# Patient Record
Sex: FEMALE | Race: BLACK OR AFRICAN AMERICAN | URBAN - METROPOLITAN AREA
[De-identification: names, ages, dates, MRNs, and addresses within clinical notes are randomized per-mention and may not be internally consistent; named-entity substitution may affect disease eponyms.]

---

## 2018-01-12 ENCOUNTER — HOSPITAL ENCOUNTER (OUTPATIENT)
Dept: MAMMOGRAPHY | Age: 58
Discharge: HOME | End: 2018-01-12
Attending: NURSE PRACTITIONER | Admitting: NURSE PRACTITIONER

## 2018-01-12 DIAGNOSIS — Z12.31 VISIT FOR SCREENING MAMMOGRAM: ICD-10-CM

## 2024-05-20 ENCOUNTER — HOSPITAL ENCOUNTER (OUTPATIENT)
Dept: WOMENS IMAGING | Age: 64
Discharge: HOME | End: 2024-05-20

## 2024-05-20 DIAGNOSIS — Z12.31 SCREENING MAMMOGRAM FOR BREAST CANCER: ICD-10-CM

## 2024-05-20 PROCEDURE — 77067 SCR MAMMO BI INCL CAD: CPT

## 2024-08-08 ENCOUNTER — HOSPITAL ENCOUNTER (OUTPATIENT)
Dept: WOMENS IMAGING | Age: 64
Discharge: HOME | End: 2024-08-08
Attending: RADIOLOGY

## 2024-08-08 DIAGNOSIS — R92.8 ABNORMAL MAMMOGRAM: ICD-10-CM

## 2024-08-08 PROCEDURE — G0279 TOMOSYNTHESIS, MAMMO: HCPCS

## 2025-01-25 ENCOUNTER — APPOINTMENT (OUTPATIENT)
Dept: GENERAL RADIOLOGY | Age: 65
End: 2025-01-25
Payer: MEDICARE

## 2025-01-25 ENCOUNTER — HOSPITAL ENCOUNTER (EMERGENCY)
Age: 65
Discharge: LEFT BEFORE DISCHARGE | End: 2025-01-26
Payer: MEDICARE

## 2025-01-25 VITALS
WEIGHT: 201.28 LBS | OXYGEN SATURATION: 100 % | HEIGHT: 65 IN | TEMPERATURE: 98.8 F | BODY MASS INDEX: 33.54 KG/M2 | SYSTOLIC BLOOD PRESSURE: 154 MMHG | RESPIRATION RATE: 18 BRPM | HEART RATE: 89 BPM | DIASTOLIC BLOOD PRESSURE: 75 MMHG

## 2025-01-25 DIAGNOSIS — Z53.21 ELOPED FROM EMERGENCY DEPARTMENT: Primary | ICD-10-CM

## 2025-01-25 DIAGNOSIS — I10 ELEVATED BLOOD PRESSURE READING IN OFFICE WITH DIAGNOSIS OF HYPERTENSION: ICD-10-CM

## 2025-01-25 DIAGNOSIS — Z20.822 LAB TEST NEGATIVE FOR COVID-19 VIRUS: ICD-10-CM

## 2025-01-25 DIAGNOSIS — R05.1 ACUTE COUGH: ICD-10-CM

## 2025-01-25 LAB — SARS-COV-2 RDRP RESP QL NAA+PROBE: NOT DETECTED

## 2025-01-25 PROCEDURE — 71046 X-RAY EXAM CHEST 2 VIEWS: CPT

## 2025-01-25 PROCEDURE — 6370000000 HC RX 637 (ALT 250 FOR IP): Performed by: NURSE PRACTITIONER

## 2025-01-25 PROCEDURE — 99284 EMERGENCY DEPT VISIT MOD MDM: CPT

## 2025-01-25 PROCEDURE — 87635 SARS-COV-2 COVID-19 AMP PRB: CPT

## 2025-01-25 RX ORDER — BENZONATATE 100 MG/1
100 CAPSULE ORAL ONCE
Status: COMPLETED | OUTPATIENT
Start: 2025-01-25 | End: 2025-01-25

## 2025-01-25 RX ORDER — BENZONATATE 100 MG/1
100 CAPSULE ORAL 3 TIMES DAILY PRN
Qty: 21 CAPSULE | Refills: 0 | Status: SHIPPED | OUTPATIENT
Start: 2025-01-25 | End: 2025-02-01

## 2025-01-25 RX ADMIN — BENZONATATE 100 MG: 100 CAPSULE ORAL at 13:54

## 2025-01-25 ASSESSMENT — PAIN - FUNCTIONAL ASSESSMENT: PAIN_FUNCTIONAL_ASSESSMENT: 0-10

## 2025-01-25 ASSESSMENT — PAIN DESCRIPTION - LOCATION: LOCATION: THROAT

## 2025-01-25 ASSESSMENT — PAIN SCALES - GENERAL: PAINLEVEL_OUTOF10: 2

## 2025-01-25 NOTE — DISCHARGE INSTRUCTIONS
Return to the emergency department for new or worsening symptoms including, but not limited to, developing fever, chills, sweats, inability tolerate food or drink, or other symptoms/concerns.      Medication as prescribed    Follow-up with your primary care provider for reevaluation next 2-3 days.  If you do not have a PCP please follow-up with the [facility] residency clinic in order to establish a PCP for your future nonemergent medical needs          [facility] internal medicine residency practice  [address], [place] [address] [phone]         [facility]  [address] [facility] [address] [id number] [phone]      [facility]  [phone]

## 2025-01-25 NOTE — ED PROVIDER NOTES
[facility] EMERGENCY DEPARTMENT  EMERGENCY DEPARTMENT ENCOUNTER        Pt Name: Radha Kebede  MRN: [id number]  Birthdate 1960  Date of evaluation: 1/25/2025  Provider: MERARY Alcala CNP  PCP: None, None  Note Started: 4:34 PM EST 1/25/25      TATUM. I have evaluated this patient.        CHIEF COMPLAINT       Chief Complaint   Patient presents with    Cough     Patient presents dry cough for the past 2 days.        HISTORY OF PRESENT ILLNESS: 1 or more Elements     History from : Patient    Limitations to history : None    Radha Kebede is a 64 y.o. nontoxic, well-appearing female who presents to the emergency department for evaluation of a 2-day history of dry cough.  No accompanying symptoms.  Patient denies chest pain, nausea, vomiting, lions with dizziness, presyncope, shortness of breath, measured fevers, chills, sweats, change in ability to smell/taste, hemoptysis, leg/calf pain or swelling, congestion, headaches, bodyaches, abdominal pain, diarrhea, orthopnea, lower extremity pain or swelling, other symptoms/concerns.    Nursing Notes were all reviewed and agreed with or any disagreements were addressed in the HPI.    REVIEW OF SYSTEMS :      Review of Systems   Constitutional:  Negative for chills, diaphoresis, fatigue and fever.   HENT:  Negative for congestion and sore throat.    Eyes:  Negative for pain and visual disturbance.   Respiratory:  Positive for cough. Negative for shortness of breath.    Cardiovascular:  Negative for chest pain and leg swelling.   Gastrointestinal:  Negative for abdominal pain, anal bleeding, diarrhea, nausea and vomiting.   Genitourinary:  Negative for difficulty urinating, dysuria, frequency and urgency.   Musculoskeletal:  Negative for back pain, neck pain and neck stiffness.   Skin:  Negative for rash and wound.   Neurological:  Negative for dizziness and light-headedness.   Hematological: Negative.    Psychiatric/Behavioral: Negative.

## 2025-01-25 NOTE — ED TRIAGE NOTES
Patient presents with her niece with concerns for dry cough for 2 days at night. No respiratory distress or shortness of breath.

## 2025-01-28 ASSESSMENT — ENCOUNTER SYMPTOMS
VOMITING: 0
EYE PAIN: 0
ANAL BLEEDING: 0
BACK PAIN: 0
COUGH: 1
SHORTNESS OF BREATH: 0
DIARRHEA: 0
ABDOMINAL PAIN: 0
NAUSEA: 0
SORE THROAT: 0